# Patient Record
Sex: FEMALE
[De-identification: names, ages, dates, MRNs, and addresses within clinical notes are randomized per-mention and may not be internally consistent; named-entity substitution may affect disease eponyms.]

---

## 2020-08-17 ENCOUNTER — HOSPITAL ENCOUNTER (OUTPATIENT)
Dept: PHYSICAL THERAPY | Facility: CLINIC | Age: 25
Setting detail: THERAPIES SERIES
Discharge: HOME | End: 2020-08-17
Payer: COMMERCIAL

## 2020-08-17 PROCEDURE — 97110 THERAPEUTIC EXERCISES: CPT

## 2020-08-17 PROCEDURE — 97162 PT EVAL MOD COMPLEX 30 MIN: CPT

## 2020-08-17 NOTE — FLOWSHEET NOTE
[employer] [address] [place] [address]  [address], [facility] 36.  Phone: [phone]  Fax:     [phone]    Physical Therapy Evaluation    Date:  2020  Patient: Aleajndro Pires  : 1995  MRN: [id number]  Physician: Malaika Clinton     Insurance: BCBS             Eligibility Status:  Eligible     Secondary Insurance (if applicable) ALLIED BENEFIT SYSTEM             Eligibility Status: Eligible     DOS: 20  # of visits allowed/remaining: based on med necessity   Source: Phone  Spoke To:  Joan  Reference: [id number]  Auth: NPRe  Medical Diagnosis: B post tibialis tendonitis    Rehab Codes: M76.829  Onset Date:                                    Subjective:  Pt reports pain of R > L med forefoot, med arch region, episodes of lat ankle pain, difficulty w/ prolonged walking, not able to run w/o pain. . Pt w/ long hx of foot/arch pain, surgical removal of R accessory navicular in . Previous PT, Orthotics, however symptoms never fully resolved. Pt noted progressive worsening of pain w/ her attempts to perform fitness exercises thi past winter, but does not recall specific injury.      PMHx: [] Unremarkable [] Diabetes [] HTN  [] Pacemaker   [] MI/Heart Problems [] Cancer [] Arthritis [] Asthma                         [x] refer to full medical chart  In [facility]  [] Other:        Tests: [x] X-Ray: [] MRI:  [] Other:    Medications: [x] Refer to full medical record [] None [] Other:  Allergies:      [x] Refer to full medical record [] None [] Other:    Function:  Hand Dominance  [x] Right  [] Left  Working:  [x] Normal Duty  [] Light Duty  [] Off D/T Condition  [] Retired     [] Not Employed  []  Disability  [] Other:            Return to work:   Job/ADL Description: Pt employed as  at [employer].    Pain:  [x] Yes  [] No Pain Rating: (0-10 scale) 8/10  Pain altered Tx:  [] Yes  [x] No  Action:    Symptoms:  [] Improving [] Worsening [x] Same    Objective:     L/R ROM  ° A/P STRENGTH   Ankle DF WNL WNL 4+ 4+   PF WNL WNL 4+ 4+   IV WNL WNL 4 4   EV WNL WNL 4+ 4+     OBSERVATION No Deficit Deficit Not Tested Comments   Palpation [] [x]     Sensation [x] []     Edema [x] []       FUNCTION Normal Difficult Unable   Ambulation [] [x] []   Squatting [] [x] []   Calf raise [] [x] []     ASSESSMENT   Pt limited by B pes planus foot, weakness w/ loss of eccentric subtalar joint control in WB activties w/ resultant med foot/arch pain. Pt would benefit from updated Orthotics along w/ use of stability shoe for activity. Will initiate exercises to improve foot/ankle strength, stability. PROBLEMS  Ankle pain  Ankle weakness  Ankle functional deficits    SHORT TERM GOALS ( 8 visits)  Ankle pain = 0  Ankle strength = 4+/5  Ankle function: walk, squat, calf raise w/o pain    LONG TERM GOALS ( 12 visits)  Independent Home Exercise program  Return to normal activity    PATIENT GOAL  Run w/o pain    Rehab Potential:  [x] Good  [] Fair  [] Poor   Suggested Professional Referral:  [x] No  [] Yes:  Barriers to Goal Achievement[de-identified]  [x] No  [] Yes:  Domestic Concerns:  [x] No  [] Yes:    Pt. Education:  [x] Plans/Goals, Risks/Benefits discussed  [x] Home exercise program  Method of Education: [x] Verbal  [x] Demo  [x] Written  Comprehension of Education:  [x] Verbalizes understanding. [x] Demonstrates understanding. [] Needs Review. [] Demonstrates/verbalizes understanding of HEP/Ed previously given.     Treatment Plan:  [x] Therapeutic Exercise    [] Modalities:  [] Therapeutic Activity    [] Ultrasound  [] Electrical Stimulation  [] Gait Training     [] Massage       [] Lumbar/Cervical Traction  [] Neuromuscular Re-education [x] Cold/hotpack [] Instruction in HEP  [] Manual Therapy   [] Aquatic Therapy [] Other:     [] Iontophoresis: 4 mg/mL Dexamethasone Sodium Phosphate 40-80 mAmin  [] Drug allergies reviewed    _______Initials           _______Date       Frequency:  1-2 x/week for 12 visits    Todays Treatment:     8/17/2020 Visit #1    Exercise Reps/ Time Weight/ Level Comments   Bike 10 min     Lat lag swings w/ IR foot 3x10     45 deg med knee drives w/ IR foot  6O55      R stance w/ Leg lunges fwd, lat, ER 3x10               Specific Instructions for next treatment:    Treatment Charges: Mins Units   [x] Evaluation ----- 1   []  Modalities     [x]  Ther Exercise 20 1   []  Manual Therapy     []  Ther Activities     []  Aquatics     []  Other       Time in: 1500     Time out: 1600    Electronically signed by: Conchita Jarrett PT        Physician Signature:________________________________Date:__________________  By signing above, I have reviewed this plan of care and certify a need for medically   necessary rehabilitation services.      *PLEASE SIGN ABOVE AND FAX BACK ALL PAGES*

## 2020-08-19 ENCOUNTER — HOSPITAL ENCOUNTER (OUTPATIENT)
Dept: PHYSICAL THERAPY | Facility: CLINIC | Age: 25
Setting detail: THERAPIES SERIES
Discharge: HOME | End: 2020-08-19
Payer: COMMERCIAL

## 2020-08-19 PROCEDURE — 97110 THERAPEUTIC EXERCISES: CPT

## 2020-08-19 NOTE — FLOWSHEET NOTE
[] [facility]) - [facility] &  Therapy  [address]  [phone]  F: [phone] [] [address]  [facility]   [address]  P: [phone]  F: [phone] [x] [address] &  Therapy  [facility]  P: [phone]  F: [phone] [] [address]  [facility]   [address]   [place]: [phone]  F: [phone]      Physical Therapy Daily Treatment Note    Date:  2020  Patient Name:  Yasir Shaikh    :  1995  MRN: [id number]  Physician: [address] [place] [address] Service Road: [facility] Diagnosis: B post tibialis tendonitis                          Rehab Codes: M76.829  Onset Date:                 Visit# / total visits:      Cancels/No Shows: 0/0    Subjective:    Pain:  [] Yes  [x] No Location: BLE posterior tib  Pain Rating: (0-10 scale) 0/10  Pain altered Tx:  [x] No  [] Yes  Action:  Comments: Pt reports no pain since she hasn't worked out all week. Pt states when it does bother her, R is still greater than L.     Objective:  Modalities:   Precautions:  Exercises:  Exercise Reps/ Time Weight/ Level Comments    Bike 10'   x   Gastroc Stretch 3x30\"   x   Soleus stretch 3x30\"   x   Lat leg swing w/ foot IR 3x10  Against wall x   45 deg med knee drives w/ foot IR 2O89   x   Lunge matrix 5x ea  Bilat x   Eccentric calf raises 2x10  3\" eccentric, floor x   SL RDL with cones 2x ea 3 cones  x   T-band foot inversion 3x10 No band  x   T-band foot eversion 3x10 No band  x   Toe Yoga NEXT      Arch shortening NEXT      Other:    Specific Instructions for subsequent treatments:     Treatment Charges: Mins Units   []  Modalities     [x]  Ther Exercise 45 3   []  Manual Therapy     []  Ther Activities     []  Aquatics     []  Vasocompression     []  Other     Total

## 2020-08-25 ENCOUNTER — APPOINTMENT (OUTPATIENT)
Dept: PHYSICAL THERAPY | Facility: CLINIC | Age: 25
End: 2020-08-25
Payer: COMMERCIAL

## 2020-08-27 ENCOUNTER — HOSPITAL ENCOUNTER (OUTPATIENT)
Dept: PHYSICAL THERAPY | Facility: CLINIC | Age: 25
Setting detail: THERAPIES SERIES
Discharge: HOME | End: 2020-08-27
Payer: COMMERCIAL

## 2020-09-03 ENCOUNTER — HOSPITAL ENCOUNTER (OUTPATIENT)
Dept: PHYSICAL THERAPY | Facility: CLINIC | Age: 25
Setting detail: THERAPIES SERIES
Discharge: HOME | End: 2020-09-03
Payer: COMMERCIAL

## 2020-09-03 PROCEDURE — 97110 THERAPEUTIC EXERCISES: CPT

## 2020-09-03 NOTE — FLOWSHEET NOTE
[] [facility]) - [facility] &  Therapy  [address]  [phone]  F: [phone] [] [address]  [facility] 36   [address]  P: [phone]  F: [phone] [x] [facility] &  Therapy  [facility]  P: [phone]  F: [phone] [] [address]  [facility]   [address]   [place]: [phone]  F: [phone]      Physical Therapy Daily Treatment Note    Date:  9/3/2020  Patient Name:  Angela Magana    :  1995  MRN: [id number]  Physician: [address] [place] [address] Service [address]: [facility] Diagnosis: B post tibialis tendonitis                          Rehab Codes: M76.829  Onset Date:                 Visit# / total visits: 3/12     Cancels/No Shows: 0/0    Subjective: Pt reports feet continue to be sore at time, R>L. Pain level depends on activity. Was sore following her previous visit. Had to cancel her previous two visits d/t being out of town for work.    Pain:  [] Yes  [x] No Location: BLE posterior tib  Pain Rating: (0-10 scale) 0/10  Pain altered Tx:  [x] No  [] Yes  Action:  Comments:     Objective:  Modalities:   Precautions:  Exercises:  Exercise Reps/ Time Weight/ Level Comments    Bike 10'   x   Gastroc Stretch 3x30\"   x   Soleus stretch 3x30\"   x   Lat leg swing w/ foot IR 3x10  Against wall    45 deg med knee drives w/ foot IR 5D98      Lunge matrix 5x ea  Bilat    Eccentric calf raises 2x10  3\" eccentric, floor x   SL RDL with cones 1x ea 3 cones  x   T-band 4 way ankle 15x lime  x   3 way hip 10x ea blue R CKC x   Toe Yoga 20x   x   Arch shortening 20x   x   Other:    Specific Instructions for subsequent treatments:     Treatment Charges: Mins Units   []  Modalities     [x]  Ther Exercise 45 3   []  Manual Therapy     []  Ther Activities     []

## 2020-09-10 ENCOUNTER — HOSPITAL ENCOUNTER (OUTPATIENT)
Dept: PHYSICAL THERAPY | Facility: CLINIC | Age: 25
Setting detail: THERAPIES SERIES
Discharge: HOME | End: 2020-09-10
Payer: COMMERCIAL

## 2020-09-10 PROCEDURE — 97110 THERAPEUTIC EXERCISES: CPT

## 2020-09-10 PROCEDURE — 97140 MANUAL THERAPY 1/> REGIONS: CPT

## 2020-09-10 NOTE — FLOWSHEET NOTE
[] [facility]) - [facility] &  Therapy  [address]  [phone]  F: [phone] [] [address]  [facility] 36   [address]  P: [phone]  F: [phone] [x] [address] &  Therapy  1500 [facility]  P: [phone]  F: [phone] [] [address]  [facility]   [address]   [place]: [phone]  F: [phone]      Physical Therapy Daily Treatment Note    Date:  9/10/2020  Patient Name:  Brianne Mercado    :  1995  MRN: [id number]  Physician: [address] [place] [address] [address]: [facility]       Medical Diagnosis: B post tibialis tendonitis                          Rehab Codes: M76.829  Onset Date:                 Visit# / total visits: 3/12     Cancels/No Shows: 0/0    Subjective: Pt states she is in a wedding this week and has been trying to wear high heels to prepare, increased soreness because of this. Pain:  [] Yes  [x] No Location: BLE posterior tib  Pain Rating: (0-10 scale) 0/10  Pain altered Tx:  [x] No  [] Yes  Action:  Comments:     Objective:  Modalities: CP bilat ankle 10' post ex  Precautions:  Exercises:  Exercise Reps/ Time Weight/ Level Comments    Bike 10'   x   Gastroc Stretch 3x30\"   x   HS stool stretch 2x30\"   x   BAPS bilat 15x ea L2 Fwd/back.  CW/CCW x   Balance Board L2x2'   x   Lat leg swing w/ foot IR 3x10  Against wall    45 deg med knee drives w/ foot IR 3T24      Lunge matrix 5x ea  Bilat    Eccentric calf raises 2x10  3\" eccentric, floor    SL RDL with cones 1x ea 3 cones  x   T-band 4 way ankle 15x lime  x   3 way hip 10x ea blue R CKC    Toe Yoga 20x   x   Arch shortening 20x   x   Other:    MFR via hypervolt bilat calf - 8'    Specific Instructions for subsequent treatments:     Treatment Charges: Mins Units   [x]  Modalities: CP 10 nc   [x] Ther Exercise 30 2   [x]  Manual Therapy 8 1   []  Ther Activities     []  Aquatics     []  Vasocompression     []  Other     Total Treatment time 48 3       Assessment: [x] Progressing toward goals. Added BAPS and balance board this date to address ROM and proprioception, pt challenged by both. Held SLS ex this date to avoid increased ankle irritation. Initiated manual this date to address soft tissue soreness bilaterally at calves. Provided CP post ex for symptom relief. [] No change. [x] Other:        SHORT TERM GOALS ( 8 visits)  Ankle pain = 0  Ankle strength = 4+/5  Ankle function: walk, squat, calf raise w/o pain     LONG TERM GOALS ( 12 visits)  Independent Home Exercise program  Return to normal activity     PATIENT GOAL  Run w/o pain    Pt. Education:  [x] Yes  [] No  [] Reviewed Prior HEP/Ed  Method of Education: [x] Verbal  [x] Demo  [] Written  Comprehension of Education:  [x] Verbalizes understanding. [x] Demonstrates understanding. [] Needs review. [] Demonstrates/verbalizes HEP/Ed previously given. Plan: [x] Continue current frequency toward long and short term goals.           Time In: 4:05pm            Time Out: 5:00pm    Electronically signed by:  Nenita Carpio PTA

## 2020-09-14 ENCOUNTER — HOSPITAL ENCOUNTER (OUTPATIENT)
Dept: PHYSICAL THERAPY | Facility: CLINIC | Age: 25
Setting detail: THERAPIES SERIES
Discharge: HOME | End: 2020-09-14
Payer: COMMERCIAL

## 2020-09-14 PROCEDURE — 97110 THERAPEUTIC EXERCISES: CPT

## 2020-09-14 PROCEDURE — 97016 VASOPNEUMATIC DEVICE THERAPY: CPT

## 2020-09-14 NOTE — FLOWSHEET NOTE
[] [facility]) - [facility]  [facility]  [address]  [phone]  F: [phone] [] [address]  [place] 36   [address]  P: [phone]  F: [phone] [x] [address] &  Therapy  [facility]  P: [phone]  F: [phone] [] [address]  [facility]   [address]   [place]: [phone]  F: [phone]      Physical Therapy Daily Treatment Note    Date:  2020  Patient Name:  Tena Kerr    :  1995  MRN: [id number]  Physician: [address] [place] [address] Service [address]: [facility]       Medical Diagnosis: B post tibialis tendonitis                          Rehab Codes: M76.829  Onset Date:                 Visit# / total visits:      Cancels/No Shows: 0/0    Subjective: Pt mentions only mild soreness following her previous visit. Was in a wedding this past weekend which required her to wear high heels which increased pain bilaterally in her feet, soaked them in ice after which helped decrease pain. Pain:  [] Yes  [x] No Location: BLE posterior tib  Pain Rating: (0-10 scale) 0/10  Pain altered Tx:  [x] No  [] Yes  Action:  Comments:     Objective:  Modalities: Vasocompression bilat ankle - 15', medium pressure  Precautions:  Exercises:  Exercise Reps/ Time Weight/ Level Comments    Bike 10'   x   Gastroc Stretch 3x30\"   x   HS stool stretch 2x30\"   x   BAPS bilat 15x ea L2 Fwd/back.  CW/CCW x   Balance Board L2x2'   x   Lat leg swing w/ foot IR 3x10  Against wall    45 deg med knee drives w/ foot IR 6L43      Lunge matrix 5x ea  Bilat x   Eccentric calf raises 2x10  3\" eccentric, floor x   SL RDL with cones 1x ea 3 cones  x   T-band 4 way ankle 15x lime  x   3 way hip 10x ea blue R CKC    Toe Yoga 20x   x   Arch shortening 20x   x   Other:    MFR via hypervolt bilat

## 2020-09-16 ENCOUNTER — HOSPITAL ENCOUNTER (OUTPATIENT)
Dept: PHYSICAL THERAPY | Facility: CLINIC | Age: 25
Setting detail: THERAPIES SERIES
Discharge: HOME | End: 2020-09-16
Payer: COMMERCIAL

## 2020-09-16 PROCEDURE — 97110 THERAPEUTIC EXERCISES: CPT

## 2020-09-16 PROCEDURE — 97016 VASOPNEUMATIC DEVICE THERAPY: CPT

## 2020-09-16 NOTE — FLOWSHEET NOTE
8'    Specific Instructions for subsequent treatments:     Treatment Charges: Mins Units   []  Modalities: CP     [x]  Ther Exercise 45 3   [x]  Manual Therapy     []  Ther Activities     []  Aquatics     [x]  Vasocompression 10 1   []  Other     Total Treatment time 55 4       Assessment: [x] Progressing toward goals. Continued with SLS ex to progress SL stability and improved tolerance. Minor improvements noted with less UE support to complete balance board and 3 way hip. Lunges completed onto [employer] ball for additional challenge. TGYM squats added this date to introduce squatting movement, good tolerance. Soreness present post ex. [] No change. [x] Other:        SHORT TERM GOALS ( 8 visits)  Ankle pain = 0  Ankle strength = 4+/5  Ankle function: walk, squat, calf raise w/o pain     LONG TERM GOALS ( 12 visits)  Independent Home Exercise program  Return to normal activity     PATIENT GOAL  Run w/o pain    Pt. Education:  [x] Yes  [] No  [] Reviewed Prior HEP/Ed  Method of Education: [x] Verbal  [x] Demo  [] Written  Comprehension of Education:  [x] Verbalizes understanding. [x] Demonstrates understanding. [] Needs review. [] Demonstrates/verbalizes HEP/Ed previously given. Plan: [x] Continue current frequency toward long and short term goals.           Time In: 5:00pm            Time Out: 6:00pm    Electronically signed by:  Jose David Carroll PTA

## 2020-09-21 ENCOUNTER — HOSPITAL ENCOUNTER (OUTPATIENT)
Dept: PHYSICAL THERAPY | Facility: CLINIC | Age: 25
Setting detail: THERAPIES SERIES
Discharge: HOME | End: 2020-09-21
Payer: COMMERCIAL

## 2020-09-21 PROCEDURE — 97016 VASOPNEUMATIC DEVICE THERAPY: CPT

## 2020-09-21 PROCEDURE — 97110 THERAPEUTIC EXERCISES: CPT

## 2020-09-21 NOTE — FLOWSHEET NOTE
[] [facility]) - [facility] &  Therapy  [address]  [phone]  F: [phone] [] [address]  [facility] 36   [address]  P: [phone]  F: [phone] [x] [address] &  Therapy  [address] [facility]  P: [phone]  F: [phone] [] [address]  [facility]   [address]   [place]: [phone]  F: [phone]      Physical Therapy Daily Treatment Note    Date:  2020  Patient Name:  Alexandra Espinal    :  1995  MRN: [id number]  Physician: [address] [place] [address] [address]: [place] [address]       Medical Diagnosis: B post tibialis tendonitis                          Rehab Codes: M76.829  Onset Date:                 Visit# / total visits:      Cancels/No Shows: 0/0    Subjective: Pt feels she is improving some with PT, has not done much that would challenge her ankles at this point. Did get fitted for new orthodics. Pain:  [] Yes  [x] No Location: BLE posterior tib  Pain Rating: (0-10 scale) 0/10  Pain altered Tx:  [x] No  [] Yes  Action:  Comments:     Objective:  Modalities: Vasocompression bilat ankle - 15', medium pressure  Precautions:  Exercises:  Exercise Reps/ Time Weight/ Level Comments    Bike 10'   x   Gastroc Stretch 3x30\"   x   HS stool stretch 2x30\"   x   BAPS bilat 15x ea L2 Fwd/back.  CW/CCW x   Balance Board L2x2'   x   Lat leg swing w/ foot IR 3x10  Against wall    45 deg med knee drives w/ foot IR 0S03      BOSU Lunges 10x ea  Bilat x   BOSU step ups 10x ea   x   Eccentric calf raises 2x10  3\" eccentric, floor x   TGym squats/HR 30x ea L18     SL RDL with cones 1x ea 3 cones     T-band 4 way ankle 15x lime  x   3 way hip 10x ea blue R CKC x   Rebounder 20x ea 2KG     Toe Yoga 20x      Arch shortening 20x      Other:    MFR via hypervolt bilat calf -

## 2020-09-23 ENCOUNTER — HOSPITAL ENCOUNTER (OUTPATIENT)
Dept: PHYSICAL THERAPY | Facility: CLINIC | Age: 25
Setting detail: THERAPIES SERIES
Discharge: HOME | End: 2020-09-23
Payer: COMMERCIAL

## 2020-09-23 PROCEDURE — 97016 VASOPNEUMATIC DEVICE THERAPY: CPT

## 2020-09-23 PROCEDURE — 97110 THERAPEUTIC EXERCISES: CPT

## 2020-09-23 NOTE — FLOWSHEET NOTE
[] [facility]) - [facility]  [facility]  [address]  [phone]  F: [phone] [] [address]  [facility] 36   [address]  P: [phone]  F: [phone] [x] [address] &  Therapy  [address] [facility]  P: [phone]  F: [phone] [] [address]  [facility]   [address]   [place]: [phone]  F: [phone]      Physical Therapy Daily Treatment Note    Date:  2020  Patient Name:  Otoniel Jon    :  1995  MRN: [id number]  Physician: [address] [place] [address] Service [address]: [facility]       Medical Diagnosis: B post tibialis tendonitis                          Rehab Codes: M76.829  Onset Date:                 Visit# / total visits:      Cancels/No Shows: 0/0    Subjective: Pt with no new issues to report at this time. Pain:  [] Yes  [x] No Location: BLE posterior tib  Pain Rating: (0-10 scale) 0/10  Pain altered Tx:  [x] No  [] Yes  Action:  Comments:     Objective:  Modalities: Vasocompression bilat ankle - 15', medium pressure  Precautions:  Exercises:  Exercise Reps/ Time Weight/ Level Comments    Bike 10'   x   Gastroc Stretch 3x30\"   x   HS stool stretch 2x30\"   x   BAPS bilat 15x ea L3 Fwd/back.  CW/CCW x   Balance Board L2x2'      Lat leg swing w/ foot IR 3x10  Against wall    45 deg med knee drives w/ foot IR 9X24      BOSU Lunges 10x ea  Bilat x   BOSU step ups 10x ea   x   Eccentric calf raises 2x10  3\" eccentric, floor x   TGym squats/HR SL 30x ea L19  x   SL RDL with cones 2x ea 3 cones  x   T-band 4 way ankle 15x blue  x   3 way hip 10x ea blue R CKC x   Rebounder 20x ea 2KG     Toe Yoga 20x      Arch shortening 20x      Other:    MFR via hypervolt bilat calf - prn    Specific Instructions for subsequent treatments:     Treatment Charges: Mins Units   [] Modalities: CP     [x]  Ther Exercise 45 3   [x]  Manual Therapy     []  Ther Activities     []  Aquatics     [x]  Vasocompression 10 1   []  Other     Total Treatment time 55 4       Assessment: [x] Progressing toward goals. Pt demonstrating improved control with increased level on BAPS. Instructed in SL Tgym squats to improve LE strength and stability. Able to increase reps of SL DL cones this date d/t improved stability and endurance, reporting soreness after but less intensity of soreness. Continued with vaso post ex for pain control. [] No change. [x] Other:        SHORT TERM GOALS ( 8 visits)  Ankle pain = 0  Ankle strength = 4+/5  Ankle function: walk, squat, calf raise w/o pain     LONG TERM GOALS ( 12 visits)  Independent Home Exercise program  Return to normal activity     PATIENT GOAL  Run w/o pain    Pt. Education:  [x] Yes  [] No  [] Reviewed Prior HEP/Ed  Method of Education: [x] Verbal  [x] Demo  [] Written  Comprehension of Education:  [x] Verbalizes understanding. [x] Demonstrates understanding. [] Needs review. [] Demonstrates/verbalizes HEP/Ed previously given. Plan: [x] Continue current frequency toward long and short term goals.           Time In: 5:00pm            Time Out: 6:00pm    Electronically signed by:  Deanna Son PTA

## 2020-09-28 ENCOUNTER — HOSPITAL ENCOUNTER (OUTPATIENT)
Dept: PHYSICAL THERAPY | Facility: CLINIC | Age: 25
Setting detail: THERAPIES SERIES
Discharge: HOME | End: 2020-09-28
Payer: COMMERCIAL

## 2020-09-28 PROCEDURE — 97110 THERAPEUTIC EXERCISES: CPT

## 2020-09-28 NOTE — FLOWSHEET NOTE
[] [facility]) - [facility]  [facility]  [address]  [phone]  F: [phone] [] [address]  [facility] 36   [address]  P: [phone]  F: [phone] [x] [address] &  Therapy  [address] [facility] [address]  P: [phone]  F: [phone] [] [address]  [facility]   [address]   [place]: [phone]  F: [phone]      Physical Therapy Daily Treatment Note    Date:  2020  Patient Name:  Sotero Boo    :  1995  MRN: [id number]  Physician: [address] [place] [address] Service [address]: [facility]       Medical Diagnosis: B post tibialis tendonitis                          Rehab Codes: M76.829  Onset Date:                 Visit# / total visits:      Cancels/No Shows: 0/0    Subjective: Pt feels some slight improvement has been being made, less pain following a shift at work this past Saturday. Pain:  [] Yes  [x] No Location: BLE posterior tib  Pain Rating: (0-10 scale) 0/10  Pain altered Tx:  [x] No  [] Yes  Action:  Comments:     Objective:  Modalities: Vasocompression bilat ankle - 15', medium pressure  Precautions:  Exercises:  Exercise Reps/ Time Weight/ Level Comments    Bike 10'   x   Gastroc Stretch 3x30\"   x   HS stool stretch 2x30\"   x   BAPS bilat 15x ea L3 Fwd/back.  CW/CCW x   Balance Board L2x2'      Lat leg swing w/ foot IR 3x10  Against wall    45 deg med knee drives w/ foot IR 3Q05      BOSU Lunges 10x ea  Bilat x   BOSU step ups 10x ea   x   Eccentric calf raises 2x10  3\" eccentric, floor x   TGym squats/HR SL 30x ea L19  x   SL RDL with cones 2x ea 3 cones  x   T-band 4 way ankle 15x blue     3 way hip 10x ea blue R CKC    Rebounder 20x ea 2KG     Toe Yoga 20x      Arch shortening 20x      Other:    MFR via hypervolt bilat calf - prn    Specific Instructions for

## 2020-09-29 NOTE — FLOWSHEET NOTE
[employer]. [address] [place] [address]  [address], [facility] 36.  Phone: [phone]  Fax:     [phone]    Physical Therapy Progress Report    Date:  2020  Patient: Yasmeen Palacio  : 1995  MRN: [id number]  Physician: [id number]: [employer]             Eligibility Status:  Eligible     Secondary Insurance (if applicable) ALLIED BENEFIT SYSTEM             Eligibility Status: Eligible     DOS: 20  # of visits allowed/remaining: based on med necessity   Source: Phone  Spoke To:  Joan  Reference: [id number]  Auth: Nikhil  Medical Diagnosis: B post tibialis tendonitis    Rehab Codes: M76.829  Onset Date:                                    Subjective:  Pt cont episodes of R med arch arch pain, especially when on her feet for long periods of time at work. Pt feels her exercises have been helping, but her symptoms have not fully resolved. Pt states she has new orthotics ordered and should be receiving them within 2 wks. Pain:  [x] Yes  [] No Pain Rating: (0-10 scale) 2/10  Pain altered Tx:  [] Yes  [x] No  Action:    Symptoms:  [] Improving [] Worsening [x] Same    Objective:     L/R ROM  ° A/P STRENGTH   Ankle DF WNL WNL 4+ 4+   PF WNL WNL 4+ 4+   IV WNL WNL 4 4   EV WNL WNL 4+ 4+     OBSERVATION No Deficit Deficit Not Tested Comments   R calf raise [] [x]  +pain   R squat [] [x]  Increased hip add, IR w/ LOB       ASSESSMENT   Pt cont limited by R med arch pain, pes planus w/ weakness in eccentric subtalar control in weight bearing activities. Pt will benefit from orthotics along w/ cont PT for further single leg weight bearing strengthening, stabilization exercises.     PROBLEMS  Arch pain  Ankle weakness  Ankle functional deficits    SHORT TERM GOALS ( 8 visits)  Arch pain = 0  Ankle strength = 4+/5  Ankle function: walk, squat, calf raise w/o pain    LONG TERM GOALS ( 12 visits)  Independent Home Exercise program  Return to normal activity    Treatment Plan:  [x] Therapeutic Exercise    [] Modalities:  [] Therapeutic Activity    [] Ultrasound  [] Electrical Stimulation  [] Gait Training     [] Massage       [] Lumbar/Cervical Traction  [] Neuromuscular Re-education [x] Cold/hotpack [] Instruction in HEP  [] Manual Therapy   [] Aquatic Therapy [] Other:     [] Iontophoresis: 4 mg/mL Dexamethasone Sodium Phosphate 40-80 mAmin  [] Drug allergies reviewed    _______Initials           _______Date       Frequency:  1-2 x/week for 12 visits      Treatment Charges: Mins Units   []  Modalities     [x]  Ther Exercise 20 1   []  Manual Therapy     []  Ther Activities     []  Aquatics     []  Other       Time in: 1500     Time out: 1600    Electronically signed by: Kesha Joyner PT        Physician Signature:________________________________Date:__________________  By signing above, I have reviewed this plan of care and certify a need for medically   necessary rehabilitation services.      *PLEASE SIGN ABOVE AND FAX BACK ALL PAGES*

## 2020-09-30 ENCOUNTER — HOSPITAL ENCOUNTER (OUTPATIENT)
Dept: PHYSICAL THERAPY | Facility: CLINIC | Age: 25
Setting detail: THERAPIES SERIES
Discharge: HOME | End: 2020-09-30
Payer: COMMERCIAL

## 2020-09-30 PROCEDURE — 97110 THERAPEUTIC EXERCISES: CPT

## 2020-09-30 NOTE — FLOWSHEET NOTE
[] [facility]) - [facility]  [address]  [phone]  F: [phone] [] [address]  [facility]   [address]  P: [phone]  F: [phone] [x] [address] &  Therapy  1500 [facility]  P: [phone]  F: [phone] [] [address]  [facility]   [address]   [place]: [phone]  F: [phone]      Physical Therapy Daily Treatment Note    Date:  2020  Patient Name:  Azalea Mathews    :  1995  MRN: [id number]  Physician: [address] [place] [address] [address]: [facility]       Medical Diagnosis: B post tibialis tendonitis                          Rehab Codes: M76.829  Onset Date:                 Visit# / total visits: 10/24     Cancels/No Shows: 0/0    Subjective: Continued soreness bilat ankles, feet. More sore in L >R this date. Pain:  [] Yes  [x] No Location: BLE posterior tib  Pain Rating: (0-10 scale) 0/10  Pain altered Tx:  [x] No  [] Yes  Action:  Comments:     Objective:  Modalities: Vasocompression bilat ankle - 15', medium pressure  Precautions:  Exercises:  Exercise Reps/ Time Weight/ Level Comments    Bike 10'   x   Gastroc Stretch 3x30\"   x   HS stool stretch 2x30\"   x   BAPS bilat 15x ea L3 Fwd/back.  CW/CCW x   Balance Board L2x2'      Lat leg swing w/ foot IR 3x10  Against wall    45 deg med knee drives w/ foot IR 6W75      BOSU Lunges 10x ea  Bilat    BOSU step ups 15x ea   x   Eccentric calf raises 2x10  3\" eccentric, floor x   TGym squats/HR SL 30x ea L19  x   SL RDL with cones 2x ea 3 cones  x   T-band 4 way ankle 15x blue     3 way hip 10x ea blue R CKC x   Rebounder 20x ea 2KG     Toe Yoga 20x      Arch shortening 20x      Other:    MFR via hypervolt bilat calf - prn    Specific Instructions for subsequent treatments:     Treatment Charges: Mins Units   []  Modalities: CP     [x]  Ther Exercise 45 3   []  Manual Therapy     []  Ther Activities     []  Aquatics     []  Vasocompression     []  Other     Total Treatment time 45 3       Assessment: [x] Progressing toward goals. Continued focus on SLS stability, good tolerance. Increased soreness as compared to previous visits this date. Tenderness of PF bilaterally. Good eccentric control noted with 4 way ankle. Will continue to progress. [] No change. [x] Other:        SHORT TERM GOALS ( 8 visits)  Ankle pain = 0  Ankle strength = 4+/5  Ankle function: walk, squat, calf raise w/o pain     LONG TERM GOALS ( 12 visits)  Independent Home Exercise program  Return to normal activity     PATIENT GOAL  Run w/o pain    Pt. Education:  [x] Yes  [] No  [] Reviewed Prior HEP/Ed  Method of Education: [x] Verbal  [x] Demo  [] Written  Comprehension of Education:  [x] Verbalizes understanding. [x] Demonstrates understanding. [] Needs review. [] Demonstrates/verbalizes HEP/Ed previously given. Plan: [x] Continue current frequency toward long and short term goals.           Time In: 5:00pm            Time Out: 5:56pm    Electronically signed by:  Anusha Camara PTA

## 2020-10-05 ENCOUNTER — HOSPITAL ENCOUNTER (OUTPATIENT)
Dept: PHYSICAL THERAPY | Facility: CLINIC | Age: 25
Setting detail: THERAPIES SERIES
Discharge: HOME | End: 2020-10-05
Payer: COMMERCIAL

## 2020-10-05 PROCEDURE — 97110 THERAPEUTIC EXERCISES: CPT

## 2020-10-05 NOTE — FLOWSHEET NOTE
[] [facility]  Outpatient Rehabilitation &  Therapy  [address]  [phone]  F: [phone] [] [address]  [facility] 36   [address]  P: [phone]  F: [phone] [x] [address] &  Therapy  1500 [facility]  P: [phone]  F: [phone] [] [address]  [facility]   [address]   [place]: [phone]  F: [phone]      Physical Therapy Daily Treatment Note    Date:  10/5/2020  Patient Name:  Miriam Albert    :  1995  MRN: [id number]  Physician: [address] [place] [address] [address]: Anish Calderón       Medical Diagnosis: B post tibialis tendonitis                          Rehab Codes: M76.829  Onset Date:                 Visit# / total visits:      Cancels/No Shows: 0/0    Subjective: Pt cont c/o med forefoot pain, received new orthotics last Fri, states she is still getting used to them  Pain:  [] Yes  [x] No Location: BLE posterior tib  Pain Rating: (0-10 scale) 2/10  Pain altered Tx:  [x] No  [] Yes  Action:  Comments:     Objective:  Modalities: Vasocompression bilat ankle - 15', medium pressure  Precautions:  Exercises:  Exercise Reps/ Time Weight/ Level Comments    Bike 10'   x   Gastroc Stretch 3x30\"   x   HS stool stretch 2x30\"   x   BAPS bilat 15x ea L3 Fwd/back.  CW/CCW x   Balance Board L2x2'      Lat leg swing w/ foot IR 3x10  Against wall    45 deg med knee drives w/ foot IR [id number]      BOSU Lunges 10x ea  Bilat    BOSU high step ups w/ MB reach 3x10 6#  x   Eccentric calf raises 3x10  3\" eccentric, floor x   TGym squats/HR SL 30x ea L20  x   SL RDL with cones 2x ea 3 cones  x   T-band 4 way ankle 15x blue     3 way hip 10x ea blue R CKC w/ grn pad x   Rebounder 20x ea 2KG grn pad    Toe Yoga 20x      Arch shortening 20x      Other:    MFR via hypervolt bilat calf -

## 2020-10-08 ENCOUNTER — HOSPITAL ENCOUNTER (OUTPATIENT)
Dept: PHYSICAL THERAPY | Facility: CLINIC | Age: 25
Setting detail: THERAPIES SERIES
Discharge: HOME | End: 2020-10-08
Payer: COMMERCIAL

## 2020-10-12 ENCOUNTER — APPOINTMENT (OUTPATIENT)
Dept: PHYSICAL THERAPY | Facility: CLINIC | Age: 25
End: 2020-10-12
Payer: COMMERCIAL

## 2020-10-14 ENCOUNTER — HOSPITAL ENCOUNTER (OUTPATIENT)
Dept: PHYSICAL THERAPY | Facility: CLINIC | Age: 25
Setting detail: THERAPIES SERIES
Discharge: HOME | End: 2020-10-14
Payer: COMMERCIAL

## 2020-10-14 PROCEDURE — 97110 THERAPEUTIC EXERCISES: CPT

## 2020-10-14 PROCEDURE — 97016 VASOPNEUMATIC DEVICE THERAPY: CPT

## 2020-10-14 NOTE — FLOWSHEET NOTE
[] [facility]) - [facility]  [facility]  [address]  [phone]  F: [phone] [] [address]  [facility]   [address]  P: [phone]  F: [phone] [x] [address] &  Therapy  [address] [facility] [address]  P: [phone]  F: [phone] [] [address]  [facility]   [address]   [place]: [phone]  F: [phone]      Physical Therapy Daily Treatment Note    Date:  10/14/2020  Patient Name:  Bart López    :  1995  MRN: [id number]  Physician: [address] [place] [address] [address]: [facility]       Medical Diagnosis: B post tibialis tendonitis                          Rehab Codes: M76.829  Onset Date:                 Visit# / total visits:      Cancels/No Shows: 0/0    Subjective: Increased soreness following her previous visit. Doing well at this time, no increased pain. Pain:  [] Yes  [x] No Location: BLE posterior tib  Pain Rating: (0-10 scale) 2/10  Pain altered Tx:  [x] No  [] Yes  Action:  Comments:     Objective:  Modalities: Vasocompression bilat ankle - 15', medium pressure  Precautions:  Exercises:  Exercise Reps/ Time Weight/ Level Comments    Bike 10'   x   Gastroc Stretch 3x30\"   x   HS stool stretch 2x30\"   x   BAPS bilat 15x ea L3 Fwd/back.  CW/CCW x   Balance Board L2x2'      Lat leg swing w/ foot IR 3x10  Against wall    BOSU Lunges 10x ea  Bilat    BOSU high step ups w/ MB reach 3x10 6#  x   Eccentric calf raises 3x10  3\" eccentric, floor x   TGym squats/HR SL 30x ea L20  x   SL RDL with cones 2x ea 3 cones  x   T-band 4 way ankle 15x plum     3 way hip 10x ea blue R CKC w/ grn pad x   Rebounder 20x ea 2KG grn pad    Toe Yoga HEP      Arch shortening HEP      Other:    MFR via hypervolt bilat calf - prn    Specific Instructions for subsequent treatments: Treatment Charges: Mins Units   []  Modalities: CP     [x]  Ther Exercise 45 3   []  Manual Therapy     []  Ther Activities     []  Aquatics     [x]  Vasocompression 10 1   []  Other     Total Treatment time 55 4       Assessment: [x] Progressing toward goals. [] No change. [x] Other: Continued with SLS ex to challenge stability and improve LE strength. Noting increasded soreness of plantar aspect of bilateral feet but symptoms are consistent with previous visit. Eccentric control improving with resisted 4 wy ankle, increased resistance. SHORT TERM GOALS ( 8 visits)  Ankle pain = 0  Ankle strength = 4+/5  Ankle function: walk, squat, calf raise w/o pain     LONG TERM GOALS ( 12 visits)  Independent Home Exercise program  Return to normal activity     PATIENT GOAL  Run w/o pain    Pt. Education:  [x] Yes  [] No  [] Reviewed Prior HEP/Ed  Method of Education: [x] Verbal  [x] Demo  [] Written  Comprehension of Education:  [x] Verbalizes understanding. [x] Demonstrates understanding. [] Needs review. [] Demonstrates/verbalizes HEP/Ed previously given. Plan: [x] Continue current frequency toward long and short term goals.           Time In: 5:00pm            Time Out: 6:08pm    Electronically signed by:  Nikunj Rivera PTA

## 2020-10-19 ENCOUNTER — HOSPITAL ENCOUNTER (OUTPATIENT)
Dept: PHYSICAL THERAPY | Facility: CLINIC | Age: 25
Setting detail: THERAPIES SERIES
Discharge: HOME | End: 2020-10-19
Payer: COMMERCIAL

## 2020-10-19 PROCEDURE — 97110 THERAPEUTIC EXERCISES: CPT

## 2020-10-19 NOTE — FLOWSHEET NOTE
[] [facility]) - [facility]  [facility]  [address]  [phone]  F: [phone] [] [address]  [facility]   [address]  P: [phone]  F: [phone] [x] [address] &  Therapy  1500 [facility]  P: [phone]  F: [phone] [] [address]  [facility]   [address]   [place]: [phone]  F: [phone]      Physical Therapy Daily Treatment Note    Date:  10/19/2020  Patient Name:  Cheri Talley    :  1995  MRN: [id number]  Physician: [address] [place] [address] [address]: [facility]       Medical Diagnosis: B post tibialis tendonitis                          Rehab Codes: M76.829  Onset Date:                 Visit# / total visits:      Cancels/No Shows: 0/0    Subjective: Pt worked this past Saturday, still sore after but not to the extent as before. Wore her new insoles when she worked. Also bought new shoes with increased stability. Pain:  [] Yes  [x] No Location: BLE posterior tib  Pain Rating: (0-10 scale) 2/10  Pain altered Tx:  [x] No  [] Yes  Action:  Comments:     Objective:  Modalities: Vasocompression bilat ankle - 15', medium pressure  Precautions:  Exercises:  Exercise Reps/ Time Weight/ Level Comments    Bike 10'   x   Gastroc Stretch 3x30\"   x   HS stool stretch 2x30\"   x   BAPS bilat 15x ea L3 Fwd/back.  CW/CCW x   Balance Board L2x2'      BOSU Lunges 10x ea  Bilat    BOSU high step ups w/ MB reach 3x10 6#  x   Eccentric calf raises 3x10  3\" eccentric, floor x   TGym squats/HR SL 30x ea L20  x   SL RDL with cones 3x ea 3 cones  x   T-band 4 way ankle 15x plum     3 way hip 10x ea blue R CKC w/ grn pad x   Rebounder 20x ea 2KG grn pad x   Toe Yoga HEP      Arch shortening HEP      Other:    MFR via hypervolt bilat calf - prn    Specific Instructions

## 2020-10-23 ENCOUNTER — HOSPITAL ENCOUNTER (OUTPATIENT)
Dept: PHYSICAL THERAPY | Facility: CLINIC | Age: 25
Setting detail: THERAPIES SERIES
Discharge: HOME | End: 2020-10-23
Payer: COMMERCIAL

## 2020-10-23 PROCEDURE — 97016 VASOPNEUMATIC DEVICE THERAPY: CPT

## 2020-10-23 PROCEDURE — 97110 THERAPEUTIC EXERCISES: CPT

## 2020-10-23 PROCEDURE — 97140 MANUAL THERAPY 1/> REGIONS: CPT

## 2020-10-23 NOTE — FLOWSHEET NOTE
[] [facility]) - [facility]  [facility]  [address]  [phone]  F: [phone] [] [address]  [facility]   [address]  P: [phone]  F: [phone] [x] [address] &  Therapy  1500 [facility]  P: [phone]  F: [phone] [] [address]  [facility]   [address]   [place]: [phone]  F: [phone]      Physical Therapy Daily Treatment Note    Date:  10/23/2020  Patient Name:  Berndaette Franco    :  1995  MRN: [id number]  Physician: [address] [place] [address] [address]: [facility]       Medical Diagnosis: B post tibialis tendonitis                          Rehab Codes: M76.829  Onset Date:                 Visit# / total visits:      Cancels/No Shows: 0/0    Subjective: Pt feels slightly less bilat feet soreness today. Pain:  [] Yes  [x] No Location: BLE posterior tib  Pain Rating: (0-10 scale) 2/10  Pain altered Tx:  [x] No  [] Yes  Action:  Comments:     Objective:  Modalities: Vasocompression bilat ankle - 15', medium pressure  Precautions:  Exercises:  Exercise Reps/ Time Weight/ Level Comments    Bike 10'   x   Gastroc Stretch 3x30\"   x   HS stool stretch 2x30\"      BAPS bilat 15x ea L3 Fwd/back.  CW/CCW x   Balance Board L2x2'      BOSU Lunges 10x ea  Bilat    BOSU high step ups w/ MB reach 3x10 6#  x   Eccentric calf raises 3x10  3\" eccentric, floor x   TGym squats/HR SL 30x ea L20  x   SL RDL with cones 3x ea 3 cones  x   T-band 4 way ankle 15x plum     3 way hip 10x ea blue R CKC w/ grn pad x   Rebounder 20x ea 2KG grn pad x   Toe Yoga HEP      Arch shortening HEP      Other:    MFR bilat calf - 10'    Specific Instructions for subsequent treatments:     Treatment Charges: Mins Units   []  Modalities: CP     [x]  Ther Exercise 40 2   [x]  Manual Therapy 10 1   []  Ther Activities     []  Aquatics     [x]  Vasocompression 10 1   []  Other     Total Treatment time 60 4       Assessment: [x] Progressing toward goals. [] No change. [x] Other: Increased R plantar foot soreness with progression through treatment. SLS ex continue to be challenging. Trp noted bilaterally at medial and lateral gastroc head, R>L. Vaso applied post ex to decrease soreness. SHORT TERM GOALS ( 8 visits)  Ankle pain = 0  Ankle strength = 4+/5  Ankle function: walk, squat, calf raise w/o pain     LONG TERM GOALS ( 12 visits)  Independent Home Exercise program  Return to normal activity     PATIENT GOAL  Run w/o pain    Pt. Education:  [x] Yes  [] No  [] Reviewed Prior HEP/Ed  Method of Education: [x] Verbal  [x] Demo  [] Written  Comprehension of Education:  [x] Verbalizes understanding. [x] Demonstrates understanding. [] Needs review. [] Demonstrates/verbalizes HEP/Ed previously given. Plan: [x] Continue current frequency toward long and short term goals.           Time In: 3:00pm            Time Out: 4:00pm    Electronically signed by:  Jordyn Tsang PTA

## 2020-10-26 ENCOUNTER — HOSPITAL ENCOUNTER (OUTPATIENT)
Dept: PHYSICAL THERAPY | Facility: CLINIC | Age: 25
Setting detail: THERAPIES SERIES
Discharge: HOME | End: 2020-10-26
Payer: COMMERCIAL

## 2020-10-26 PROCEDURE — 97016 VASOPNEUMATIC DEVICE THERAPY: CPT

## 2020-10-26 PROCEDURE — 97110 THERAPEUTIC EXERCISES: CPT

## 2020-10-26 NOTE — FLOWSHEET NOTE
subsequent treatments:     Treatment Charges: Mins Units   []  Modalities: CP     [x]  Ther Exercise 45 2   []  Manual Therapy     []  Ther Activities     []  Aquatics     [x]  Vasocompression 10 1   []  Other     Total Treatment time 55 4       Assessment: [x] Progressing toward goals. [] No change. [x] Other: Continued with SLS specific therex to address stability. No significant increase in soreness with progression through ex. Added MOBO SLS ex to further address intrinsic foot and lower leg strengthening to improve stability. Pt mentioning lateral ankle soreness bilaterally following MOBO. Vaso post ex for symptom control. SHORT TERM GOALS ( 8 visits)  Ankle pain = 0  Ankle strength = 4+/5  Ankle function: walk, squat, calf raise w/o pain     LONG TERM GOALS ( 12 visits)  Independent Home Exercise program  Return to normal activity     PATIENT GOAL  Run w/o pain    Pt. Education:  [x] Yes  [] No  [] Reviewed Prior HEP/Ed  Method of Education: [x] Verbal  [x] Demo  [] Written  Comprehension of Education:  [x] Verbalizes understanding. [x] Demonstrates understanding. [] Needs review. [] Demonstrates/verbalizes HEP/Ed previously given. Plan: [x] Continue current frequency toward long and short term goals.           Time In: 4:00pm            Time Out: 5:00pm    Electronically signed by:  Maria Dolores Florence PTA

## 2020-10-28 ENCOUNTER — HOSPITAL ENCOUNTER (OUTPATIENT)
Dept: PHYSICAL THERAPY | Facility: CLINIC | Age: 25
Setting detail: THERAPIES SERIES
Discharge: HOME | End: 2020-10-28
Payer: COMMERCIAL

## 2020-10-28 PROCEDURE — 97016 VASOPNEUMATIC DEVICE THERAPY: CPT

## 2020-10-28 PROCEDURE — 97140 MANUAL THERAPY 1/> REGIONS: CPT

## 2020-10-28 PROCEDURE — 97110 THERAPEUTIC EXERCISES: CPT

## 2020-10-28 NOTE — FLOWSHEET NOTE
[] [facility]) - [facility] &  Therapy  [address]  [phone]  F: [phone] [] [address]  [facility] 36   [address]  P: [phone]  F: [phone] [x] [address] &  Therapy  1500 [facility]  P: [phone]  F: [phone] [] [address]  [facility]   [address]   [place]: [phone]  F: [phone]      Physical Therapy Daily Treatment Note    Date:  10/28/2020  Patient Name:  Uzma Hawthorne    :  1995  MRN: [id number]  Physician: [address] [place] [address] [address]: [facility]       Medical Diagnosis: B post tibialis tendonitis                          Rehab Codes: M76.829  Onset Date:                 Visit# / total visits:      Cancels/No Shows: 0/0    Subjective: Calf soreness as decreased, less pain on top of the R foot. No significant soreness present today. Pain:  [] Yes  [x] No Location: BLE posterior tib  Pain Rating: (0-10 scale) 2/10  Pain altered Tx:  [x] No  [] Yes  Action:  Comments:     Objective:  Modalities: Vasocompression bilat ankle - 15', medium pressure  Precautions:  Exercises:  Exercise Reps/ Time Weight/ Level Comments    Bike 10'   x   Gastroc Stretch 3x30\"   x   HS stool stretch 2x30\"      BAPS bilat 15x ea L3 Fwd/back.  CW/CCW x   Balance Board L2x2'      BOSU Lunges 10x ea  Bilat x   BOSU high step ups w/ MB reach 3x10 6#  x   Eccentric calf raises 3x10  3\" eccentric, floor    TGym squats/HR SL 30x ea L20  x   SL RDL 10x ea 9# KB  x   T-band 4 way ankle 15x plum     3 way hip 10x ea blue R CKC w/ grn pad    Rebounder 20x ea 2KG grn pad    MOBO rocks 30x ea bilat 2/4,1/3    MOBO SLS 2x15\"      Toe Yoga HEP      Arch shortening HEP      Other:    MFR bilat gastroc, peroneals -hypervolt same - 10'    Specific Instructions for subsequent treatments:     Treatment Charges: Mins Units   []  Modalities: CP     [x]  Ther Exercise 35 2   [x]  Manual Therapy 10 1   []  Ther Activities     []  Aquatics     [x]  Vasocompression 10 1   []  Other     Total Treatment time 55 4       Assessment: [x] Progressing toward goals. [] No change. [x] Other: progressed SL RDL cups to SL with KB to address LE strengthening as well, increased arch soreness of R foot present. Increased challenge with BOSU step ups this date. Resumed manual this date to address bilat lower leg tension. Vaso to end for symptom relief. SHORT TERM GOALS ( 8 visits)  Ankle pain = 0  Ankle strength = 4+/5  Ankle function: walk, squat, calf raise w/o pain     LONG TERM GOALS ( 12 visits)  Independent Home Exercise program  Return to normal activity     PATIENT GOAL  Run w/o pain    Pt. Education:  [x] Yes  [] No  [] Reviewed Prior HEP/Ed  Method of Education: [x] Verbal  [x] Demo  [] Written  Comprehension of Education:  [x] Verbalizes understanding. [x] Demonstrates understanding. [] Needs review. [] Demonstrates/verbalizes HEP/Ed previously given. Plan: [x] Continue current frequency toward long and short term goals.           Time In: 4:00pm            Time Out: 5:00pm    Electronically signed by:  Nikunj Rivera PTA

## 2020-11-02 ENCOUNTER — HOSPITAL ENCOUNTER (OUTPATIENT)
Dept: PHYSICAL THERAPY | Facility: CLINIC | Age: 25
Setting detail: THERAPIES SERIES
Discharge: HOME | End: 2020-11-02
Payer: COMMERCIAL

## 2020-11-02 PROCEDURE — 97140 MANUAL THERAPY 1/> REGIONS: CPT

## 2020-11-02 PROCEDURE — 97016 VASOPNEUMATIC DEVICE THERAPY: CPT

## 2020-11-02 PROCEDURE — 97110 THERAPEUTIC EXERCISES: CPT

## 2020-11-02 NOTE — FLOWSHEET NOTE
[] [facility]) - [facility] &  Therapy  [address]  [phone]  F: [phone] [] [address]  [facility] 36   [address]  P: [phone]  F: [phone] [x] [address] &  Therapy  1500 [facility]  P: [phone]  F: [phone] [] [address]  [facility]   [address]   [place]: [phone]  F: [phone]      Physical Therapy Daily Treatment Note    Date:  2020  Patient Name:  Jamie Chandra    :  1995  MRN: [id number]  Physician: [address] [place] [address] [address]: [place] [address]       Medical Diagnosis: B post tibialis tendonitis                          Rehab Codes: M76.829  Onset Date:                 Visit# / total visits:      Cancels/No Shows: 0/0    Subjective: Pt states that \"bumb on the top of her left foot is a cyst. Seen the podiatrist Friday and prescribed a compression sock she wanted her to wear. States Left lower leg has been more sore since, increased swelling as well. Pain:  [] Yes  [x] No Location: BLE posterior tib  Pain Rating: (0-10 scale) 2/10  Pain altered Tx:  [x] No  [] Yes  Action:  Comments:     Objective:  Modalities: Vasocompression bilat ankle - 15', medium pressure  Precautions:  Exercises:  Exercise Reps/ Time Weight/ Level Comments    Bike 10'   x   Gastroc Stretch 3x30\"   x   HS stool stretch 2x30\"      BAPS bilat 15x ea L3 Fwd/back.  CW/CCW x   Balance Board L2x2'      BOSU Lunges 10x ea  Bilat x   BOSU high step ups w/ MB reach 3x10 6#     Eccentric calf raises 3x10  3\" eccentric, floor    TGym squats/HR SL 30x ea L20  x   SL RDL 10x ea 9# KB  x   T-band 4 way ankle 15x plum  x   3 way hip 10x ea plum R CKC w/ blue pad x   Rebounder 20x ea 2KG grn pad    MOBO rocks 30x ea bilat 2/4,1/3    MOBO SLS 2x15\"      Toe Yoga HEP Arch shortening HEP      Other:    MFR bilat gastroc, peroneals  - 10'    Specific Instructions for subsequent treatments:     Treatment Charges: Mins Units   []  Modalities: CP     [x]  Ther Exercise 35 2   [x]  Manual Therapy 10 1   []  Ther Activities     []  Aquatics     [x]  Vasocompression 10 1   []  Other     Total Treatment time 55 4       Assessment: [x] Progressing toward goals. [] No change. [x] Other: Pt able to complete charted ex without minor discomfort of cyst on top of L foot. Instability continues to be an issue with SLS ex. Improved eccentric strength noted with4 way ankle. Bilateral gastroc/soleus tension noted. SHORT TERM GOALS ( 8 visits)  Ankle pain = 0  Ankle strength = 4+/5  Ankle function: walk, squat, calf raise w/o pain     LONG TERM GOALS ( 12 visits)  Independent Home Exercise program  Return to normal activity     PATIENT GOAL  Run w/o pain    Pt. Education:  [x] Yes  [] No  [] Reviewed Prior HEP/Ed  Method of Education: [x] Verbal  [x] Demo  [] Written  Comprehension of Education:  [x] Verbalizes understanding. [x] Demonstrates understanding. [] Needs review. [] Demonstrates/verbalizes HEP/Ed previously given. Plan: [x] Continue current frequency toward long and short term goals.           Time In: 5:00pm            Time Out: 6:00pm    Electronically signed by:  Jordyn Tsang PTA

## 2020-11-04 ENCOUNTER — HOSPITAL ENCOUNTER (OUTPATIENT)
Dept: PHYSICAL THERAPY | Facility: CLINIC | Age: 25
Setting detail: THERAPIES SERIES
Discharge: HOME | End: 2020-11-04
Payer: COMMERCIAL

## 2020-11-04 PROCEDURE — 97110 THERAPEUTIC EXERCISES: CPT

## 2020-11-04 PROCEDURE — 97140 MANUAL THERAPY 1/> REGIONS: CPT

## 2020-11-04 NOTE — FLOWSHEET NOTE
[] [facility]) [facility] &  Therapy  [address]  [phone]  F: [phone] [] [address]  [facility]   [address]  P: [phone]  F: [phone] [x] [address] &  Therapy  [address] [facility]  P: [phone]  F: [phone] [] [address]  [facility]   [address]   [place]: [phone]  F: [phone]      Physical Therapy Daily Treatment Note    Date:  2020  Patient Name:  Zohaib Hendrix    :  1995  MRN: [id number]  Physician: [address] [place] [address] Service [address]: [facility]       Medical Diagnosis: B post tibialis tendonitis                          Rehab Codes: M76.829  Onset Date:                 Visit# / total visits:      Cancels/No Shows: 0/0    Subjective:  Pt mentioning increased soreness lateral ankle, just below lateral malleolus. Pain:  [] Yes  [x] No Location: BLE posterior tib  Pain Rating: (0-10 scale) 2/10  Pain altered Tx:  [x] No  [] Yes  Action:  Comments:     Objective:  Modalities: Vasocompression bilat ankle - 15', medium pressure - held  Precautions:  Exercises:  Exercise Reps/ Time Weight/ Level Comments    Bike 10'   x   Gastroc Stretch 3x30\"   x   HS stool stretch 2x30\"      BAPS bilat 15x ea L3 Fwd/back.  CW/CCW x   Balance Board L2x2'      BOSU Lunges 10x ea  Bilat x   BOSU high step ups w/ MB reach 3x10 6#     Eccentric calf raises 3x10  3\" eccentric, floor    TGym squats/HR SL 30x ea L20     SL RDL 10x ea 9# KB  x   T-band 4 way ankle 15x plum     3 way hip 10x ea plum R CKC w/ blue pad x   Heel taps 2x10 4\"  x   Rebounder 20x ea 2KG grn pad    MOBO rocks 30x ea bilat 2/4,1/3    MOBO SLS 2x15\"      Toe Yoga HEP      Arch shortening HEP      Other:    MFR bilat gastroc, peroneals  - 10'    Specific Instructions for subsequent treatments:     Treatment Charges: Mins Units   []  Modalities: CP     [x]  Ther Exercise 35 2   [x]  Manual Therapy 10 1   []  Ther Activities     []  Aquatics     []  Vasocompression     []  Other     Total Treatment time 45 3       Assessment: [x] Progressing toward goals. [] No change. [x] Other: Continued with SLS ex with good tolerance. Improved stability noted with SLS cups this date. Added lateral heel taps to further address LE and ankle stability. Remains tender bilateral gastroc/soleus complex. Held vaso this date. SHORT TERM GOALS ( 8 visits)  Ankle pain = 0  Ankle strength = 4+/5  Ankle function: walk, squat, calf raise w/o pain     LONG TERM GOALS ( 12 visits)  Independent Home Exercise program  Return to normal activity     PATIENT GOAL  Run w/o pain    Pt. Education:  [x] Yes  [] No  [] Reviewed Prior HEP/Ed  Method of Education: [x] Verbal  [x] Demo  [] Written  Comprehension of Education:  [x] Verbalizes understanding. [x] Demonstrates understanding. [] Needs review. [] Demonstrates/verbalizes HEP/Ed previously given. Plan: [x] Continue current frequency toward long and short term goals.           Time In: 4:35pm            Time Out: 5:30pm    Electronically signed by:  Daniela Morris PTA

## 2020-11-12 ENCOUNTER — HOSPITAL ENCOUNTER (OUTPATIENT)
Dept: PHYSICAL THERAPY | Facility: CLINIC | Age: 25
Setting detail: THERAPIES SERIES
Discharge: HOME | End: 2020-11-12
Payer: COMMERCIAL

## 2020-11-12 PROCEDURE — 97110 THERAPEUTIC EXERCISES: CPT

## 2020-11-12 PROCEDURE — 97016 VASOPNEUMATIC DEVICE THERAPY: CPT

## 2020-11-12 PROCEDURE — 97140 MANUAL THERAPY 1/> REGIONS: CPT

## 2020-11-12 NOTE — FLOWSHEET NOTE
10'    Specific Instructions for subsequent treatments:     Treatment Charges: Mins Units   []  Modalities: CP     [x]  Ther Exercise 35 2   [x]  Manual Therapy 10 1   []  Ther Activities     []  Aquatics     [x]  Vasocompression 10 1   []  Other     Total Treatment time 55 3       Assessment: [x] Progressing toward goals. [] No change. [x] Other: Stability therex completed as noted per chart above. Continues to demonstrate weakness with ankle stability and maintain SLS balance. Less pain with progression through treatment. Remains extremely tender bilaterally into gatroc heads. Vaso to end treatment. SHORT TERM GOALS ( 8 visits)  Ankle pain = 0  Ankle strength = 4+/5  Ankle function: walk, squat, calf raise w/o pain     LONG TERM GOALS ( 12 visits)  Independent Home Exercise program  Return to normal activity     PATIENT GOAL  Run w/o pain    Pt. Education:  [x] Yes  [] No  [] Reviewed Prior HEP/Ed  Method of Education: [x] Verbal  [x] Demo  [] Written  Comprehension of Education:  [x] Verbalizes understanding. [x] Demonstrates understanding. [] Needs review. [] Demonstrates/verbalizes HEP/Ed previously given. Plan: [x] Continue current frequency toward long and short term goals.           Time In: 5:00pm            Time Out: 6:00pm    Electronically signed by:  Norm Dave PTA

## 2020-11-16 ENCOUNTER — HOSPITAL ENCOUNTER (OUTPATIENT)
Dept: PHYSICAL THERAPY | Facility: CLINIC | Age: 25
Setting detail: THERAPIES SERIES
Discharge: HOME | End: 2020-11-16
Payer: COMMERCIAL

## 2020-11-23 ENCOUNTER — HOSPITAL ENCOUNTER (OUTPATIENT)
Dept: PHYSICAL THERAPY | Facility: CLINIC | Age: 25
Setting detail: THERAPIES SERIES
Discharge: HOME | End: 2020-11-23
Payer: COMMERCIAL

## 2020-11-23 PROCEDURE — 97110 THERAPEUTIC EXERCISES: CPT

## 2020-11-23 PROCEDURE — 97016 VASOPNEUMATIC DEVICE THERAPY: CPT

## 2020-11-23 NOTE — FLOWSHEET NOTE
5'    Specific Instructions for subsequent treatments:     Treatment Charges: Mins Units   []  Modalities: CP     [x]  Ther Exercise 35 2   [x]  Manual Therapy 5 nc   []  Ther Activities     []  Aquatics     [x]  Vasocompression 10 1   []  Other     Total Treatment time 50 3       Assessment: [] Progressing toward goals. [] No change. [x] Other: Pt demonstrating increased challenge with stability ex this date. Increase bilateral ankle and plantar foot fatigue with progression through treatment. Good eccentric control with 4 way ankle. Increased tenderness into bilat anterior tib region. Vaso post ex to control post ex soreness. SHORT TERM GOALS ( 8 visits)  Ankle pain = 0  Ankle strength = 4+/5  Ankle function: walk, squat, calf raise w/o pain     LONG TERM GOALS ( 12 visits)  Independent Home Exercise program  Return to normal activity     PATIENT GOAL  Run w/o pain    Pt. Education:  [x] Yes  [] No  [] Reviewed Prior HEP/Ed  Method of Education: [x] Verbal  [x] Demo  [] Written  Comprehension of Education:  [x] Verbalizes understanding. [x] Demonstrates understanding. [] Needs review. [] Demonstrates/verbalizes HEP/Ed previously given. Plan: [x] Continue current frequency toward long and short term goals.           Time In: 6:00pm            Time Out: 6:55pm    Electronically signed by:  Maria Dolores Florence PTA

## 2020-12-03 ENCOUNTER — HOSPITAL ENCOUNTER (OUTPATIENT)
Dept: PHYSICAL THERAPY | Facility: CLINIC | Age: 25
Setting detail: THERAPIES SERIES
Discharge: HOME | End: 2020-12-03
Payer: COMMERCIAL

## 2020-12-03 PROCEDURE — 97140 MANUAL THERAPY 1/> REGIONS: CPT

## 2020-12-03 PROCEDURE — 97110 THERAPEUTIC EXERCISES: CPT

## 2020-12-03 NOTE — FLOWSHEET NOTE
[] [facility]) - [place] [facility]EL[facility] &  Therapy  [address]  [phone]  F: [phone] [] [address]  [facility]   [address]  P: [phone]  F: [phone] [x] [address] &  Therapy  [address] [facility]  P: [phone]  F: [phone] [] [address]  [facility]   [address]   [place]: [phone]  F: [phone]      Physical Therapy Daily Treatment Note    Date:  12/3/2020  Patient Name:  Bart López    :  1995  MRN: [id number]  Physician: [address] [place] [address] [address]: [facility] Diagnosis: B post tibialis tendonitis                          Rehab Codes: M76.829  Onset Date:                 Visit# / total visits:      Cancels/No Shows: 0/0    Subjective: Pt states she saw the podiatrist the other day and said feet were looking better but needs to work on generel LE and hip strengthening to improve stability. Arrives with new script for LE strengthening. Pain:  [] Yes  [x] No Location: BLE posterior tib  Pain Rating: (0-10 scale) 2/10  Pain altered Tx:  [x] No  [] Yes  Action:  Comments:     Objective:  Modalities: Vasocompression bilat ankle - 10', medium pressure   Precautions:  Exercises:  Exercise Reps/ Time Weight/ Level Comments    Bike 10'   x   Gastroc Stretch 3x30\"   x   HS stool stretch 2x30\"      BAPS bilat 15x ea L3 Fwd/back.  CW/CCW x          BOSU Lunges 10x ea  Bilat x   BOSU high step ups w/ MB reach 3x10 6#     TGym squats/HR SL 30x ea L20     SL RDL 10x ea 9# KB  x   T-band 4 way ankle 15x plum     3 way hip 10x ea plum R CKC w/ blue pad x   Rebounder 20x ea 2KG grn pad    Tband sidesteppping 3L lime  x          Tband Bridges 3x10 blue  x   SL clams 2x10 blue  x   SL hip abduction 2x10 blue  x   Prone hip ext 2x10  Knee bent/kne

## 2020-12-10 ENCOUNTER — HOSPITAL ENCOUNTER (OUTPATIENT)
Dept: PHYSICAL THERAPY | Facility: CLINIC | Age: 25
Setting detail: THERAPIES SERIES
Discharge: HOME | End: 2020-12-10
Payer: COMMERCIAL

## 2020-12-10 PROCEDURE — 97140 MANUAL THERAPY 1/> REGIONS: CPT

## 2020-12-10 PROCEDURE — 97110 THERAPEUTIC EXERCISES: CPT

## 2020-12-10 NOTE — FLOWSHEET NOTE
[] [facility]) - [facility]  [address]  [phone]  F: [phone] [] [address]  [facility] 36   [address]  P: [phone]  F: [phone] [x] [address] &  Therapy  [address] [facility]  P: [phone]  F: [phone] [] [address]  [facility]   [address]   [place]: [phone]  F: [phone]      Physical Therapy Daily Treatment Note    Date:  12/10/2020  Patient Name:  Bernadette Franco    :  1995  MRN: [id number]  Physician: [address] [place] [address] [address]: [facility]       Medical Diagnosis: B post tibialis tendonitis                          Rehab Codes: M76.829  Onset Date:                 Visit# / total visits:      Cancels/No Shows: 0/0    Subjective: No significant issues at this time. Increased bilateral ankle/foot soreness intermittently. Admits to not being complaint with HEP. Pain:  [] Yes  [x] No Location: BLE posterior tib  Pain Rating: (0-10 scale) 2/10  Pain altered Tx:  [x] No  [] Yes  Action:  Comments:     Objective:  Modalities: Vasocompression bilat ankle - 10', medium pressure   Precautions:  Exercises:  Exercise Reps/ Time Weight/ Level Comments    Bike 10'   x   Gastroc Stretch 3x30\"   x   HS stool stretch 2x30\"      BAPS bilat 15x ea L3 Fwd/back.  CW/CCW x          BOSU Lunges 10x ea  Bilat x   BOSU high step ups w/ MB reach 3x10 6#     TGym squats/HR  30x ea L15 sidelying    SL RDL 10x ea 9# KB  x   T-band 4 way ankle 15x plum     3 way hip 10x ea plum R CKC w/ blue pad x   Tband sidesteppping 3L blue  x          SB Bridges 3x10 blue  x   SL clams 2x10 blue  x   SL hip abduction 2x10 blue  x   Prone hip ext 2x10  Knee bent/kne straight x   Other:    MFR bilat gastroc, peroneals  - 10'    Specific Instructions for subsequent treatments:     Treatment Charges: Mins Units   []  Modalities: CP     [x]  Ther Exercise 40 2   [x]  Manual Therapy 10 1   []  Ther Activities     []  Aquatics     [x]  Vasocompression     []  Other     Total Treatment time 50 3       Assessment: [x] Progressing toward goals. [] No change. [x] Other: Continued with stability work along strengthening ex. Increased resistance with sidestepping ex and added sidelying TGym squats to address bilateral hip weakness. Pt mentions increased soreness post ex. Manual completed as noted above to continue to improve gastroc/soleus extensibility. SHORT TERM GOALS ( 8 visits)  Ankle pain = 0  Ankle strength = 4+/5  Ankle function: walk, squat, calf raise w/o pain     LONG TERM GOALS ( 12 visits)  Independent Home Exercise program  Return to normal activity     PATIENT GOAL  Run w/o pain    Pt. Education:  [x] Yes  [] No  [] Reviewed Prior HEP/Ed  Method of Education: [x] Verbal  [x] Demo  [] Written  Comprehension of Education:  [x] Verbalizes understanding. [x] Demonstrates understanding. [] Needs review. [] Demonstrates/verbalizes HEP/Ed previously given. Plan: [x] Continue current frequency toward long and short term goals.           Time In: 5:30pm            Time Out: 6:30pm    Electronically signed by:  Kaylan Hunt PTA

## 2020-12-14 ENCOUNTER — HOSPITAL ENCOUNTER (OUTPATIENT)
Dept: PHYSICAL THERAPY | Facility: CLINIC | Age: 25
Setting detail: THERAPIES SERIES
Discharge: HOME | End: 2020-12-14
Payer: COMMERCIAL

## 2020-12-14 PROCEDURE — 97140 MANUAL THERAPY 1/> REGIONS: CPT

## 2020-12-14 PROCEDURE — 97110 THERAPEUTIC EXERCISES: CPT

## 2020-12-14 NOTE — FLOWSHEET NOTE
[]  Modalities: CP     [x]  Ther Exercise 40 2   [x]  Manual Therapy 10 1   []  Ther Activities     []  Aquatics     [x]  Vasocompression     []  Other     Total Treatment time 50 3       Assessment: [x] Progressing toward goals. [] No change. [x] Other: Therex completed as noted above. Improved stability noted with SL DL cups, improved bilaterally. Pt denies completing HEP, encouraged pt to be more consistent to build LE strength and stability. Remains tender at bilateral gastroc/soleus. SHORT TERM GOALS ( 8 visits)  Ankle pain = 0  Ankle strength = 4+/5  Ankle function: walk, squat, calf raise w/o pain     LONG TERM GOALS ( 12 visits)  Independent Home Exercise program  Return to normal activity     PATIENT GOAL  Run w/o pain    Pt. Education:  [x] Yes  [] No  [] Reviewed Prior HEP/Ed  Method of Education: [x] Verbal  [x] Demo  [] Written  Comprehension of Education:  [x] Verbalizes understanding. [x] Demonstrates understanding. [] Needs review. [] Demonstrates/verbalizes HEP/Ed previously given. Plan: [x] Continue current frequency toward long and short term goals.           Time In: 5:30pm            Time Out: 6:30pm    Electronically signed by:  Shellie Self PTA

## 2020-12-21 ENCOUNTER — HOSPITAL ENCOUNTER (OUTPATIENT)
Dept: PHYSICAL THERAPY | Facility: CLINIC | Age: 25
Setting detail: THERAPIES SERIES
Discharge: HOME | End: 2020-12-21
Payer: COMMERCIAL

## 2020-12-21 PROCEDURE — 97140 MANUAL THERAPY 1/> REGIONS: CPT

## 2020-12-21 PROCEDURE — 97110 THERAPEUTIC EXERCISES: CPT

## 2020-12-28 ENCOUNTER — HOSPITAL ENCOUNTER (OUTPATIENT)
Dept: PHYSICAL THERAPY | Facility: CLINIC | Age: 25
Setting detail: THERAPIES SERIES
Discharge: HOME | End: 2020-12-28
Payer: COMMERCIAL

## 2020-12-28 PROCEDURE — 97110 THERAPEUTIC EXERCISES: CPT

## 2020-12-28 PROCEDURE — 97140 MANUAL THERAPY 1/> REGIONS: CPT

## 2021-01-14 ENCOUNTER — HOSPITAL ENCOUNTER (OUTPATIENT)
Dept: PHYSICAL THERAPY | Facility: CLINIC | Age: 26
Setting detail: THERAPIES SERIES
Discharge: HOME | End: 2021-01-14
Payer: COMMERCIAL

## 2021-01-14 PROCEDURE — 97110 THERAPEUTIC EXERCISES: CPT

## 2021-01-14 PROCEDURE — 97140 MANUAL THERAPY 1/> REGIONS: CPT

## 2021-01-14 NOTE — FLOWSHEET NOTE
[] [facility]) - [facility] &  Therapy  [address]  [phone]  F: [phone] [] [address]  [facility] 36   [address]  P: [phone]  F: [phone] [x] [address] &  Therapy  [address] [facility]  P: [phone]  F: [phone] [] [address]  [facility]   [address]   [place]: [phone]  F: [phone]      Physical Therapy Daily Treatment Note    Date:  2021  Patient Name:  Iliana Bahena    :  1995  MRN: [id number]  Physician: [address] [place] [address] [address]: [place] [address]       Medical Diagnosis: B post tibialis tendonitis                          Rehab Codes: M76.829  Onset Date:                 Visit# / total visits:      Cancels/No Shows: 0/0    Subjective: Pt states that she has had a busy few weeks transitioning to a new position at her work. Less time spent on HEP. Feet have been feeling ok, still get sore but at the same time has not done much to challenge them. Pain:  [] Yes  [x] No Location: BLE posterior tib  Pain Rating: (0-10 scale) 2/10  Pain altered Tx:  [x] No  [] Yes  Action:  Comments:     Objective:  Modalities: Vasocompression bilat ankle - 10', medium pressure - held  Precautions:  Exercises:  Exercise Reps/ Time Weight/ Level Comments    Bike 10'   x   Gastroc Stretch 3x30\"   x   HS stool stretch 2x30\"      BAPS bilat 15x ea L4 Fwd/back.  CW/CCW x          BOSU Lunges 10x ea  Bilat x   BOSU high step ups w/ MB reach 3x10 6#     TGym squats/HR  30x ea L15 sidelying    SL RDL 2x ea cups  x   T-band 4 way ankle 15x plum     3 way hip 10x ea plum R CKC w/ blue pad x   Tband sidesteppping 3L blue     Heel taps 2x10 4\"  x          SB Bridges 3x10      SL clams 2x10 blue  x   SL hip abduction 2x10 blue  x   Prone hip ext 2x10  Knee bent/kne straight    Other:    MFR bilat gastroc, peroneals  - 10'    Specific Instructions for subsequent treatments:     Treatment Charges: Mins Units   []  Modalities: CP     [x]  Ther Exercise 40 2   [x]  Manual Therapy 10 1   []  Ther Activities     []  Aquatics     [x]  Vasocompression     []  Other     Total Treatment time 50 3       Assessment: [x] Progressing toward goals. [] No change.     _ Other: Pt completed charted ex above with good tolerance. Balance ex remain challenging bilaterally but pt mentioning less pain/soreness with completion. Does fatigue with strengthening ex quickly. Significant TP noted throughout bilat gastroc soleus. SHORT TERM GOALS ( 8 visits)  Ankle pain = 0  Ankle strength = 4+/5  Ankle function: walk, squat, calf raise w/o pain     LONG TERM GOALS ( 12 visits)  Independent Home Exercise program  Return to normal activity     PATIENT GOAL  Run w/o pain    Pt. Education:  [x] Yes  [] No  [] Reviewed Prior HEP/Ed  Method of Education: [x] Verbal  [x] Demo  [] Written  Comprehension of Education:  [x] Verbalizes understanding. [x] Demonstrates understanding. [] Needs review. [] Demonstrates/verbalizes HEP/Ed previously given. Plan: [x] Continue current frequency toward long and short term goals.           Time In: 5:30 pm            Time Out: 6:20pm    Electronically signed by:  Darlene Larios PTA

## 2021-01-25 ENCOUNTER — HOSPITAL ENCOUNTER (OUTPATIENT)
Dept: PHYSICAL THERAPY | Facility: CLINIC | Age: 26
Setting detail: THERAPIES SERIES
Discharge: HOME | End: 2021-01-25
Payer: COMMERCIAL

## 2021-02-15 NOTE — FLOWSHEET NOTE
[address]  29 [facility] 36.  Phone: [phone]  Fax: [phone]    PHYSICAL THERAPY DISCHARGE SUMMARY    Date: 2/15/2021  Patient Name: Humberto Martinez        MRN: [id number]     Acct#:   : 1995  (26 y.o.)    Physician: [address] [place] [address] [address]: [place] [address]             Eligibility Status:  Eligible     Secondary Insurance (if applicable) ALLIED BENEFIT SYSTEM             Eligibility Status: Eligible     DOS: 20  # of visits allowed/remaining: based on med necessity   Source: Phone  Spoke Zora Ren  Reference: [id number]  Auth: Nikhil  Medical Diagnosis: B post tibialis tendonitis                          Rehab Codes: M76.829  Onset Date:      Date of Initial Eval: 20  Date of Final Treatment: 21  Total number of visits: 25    Discharge Status:  [ ] Patient recovered from condition. Treatment Goals were met. [ ] Patient received maximum benefit. No further therapy indicated at this time. [ ] Patient demonstrated improvement from condition with          of           goals met. [ ] Patient to continue exercises/home instructions independently. [ ] Therapy interrupted due to:  [x] Patient has two or more no-shows/cancellations and has been discontinued per our no show/cancellation policy. [ ] Patient has completed their prescribed number of treatment sessions. [ ] Other:      Pain level at Evaluation was    8    /10 and at Discharge was     2   /10. [ ] Patient returned to work. [ ] Patient demonstrated improved level of function. [ ] Patient has returned to previous functional level. [x] Patients current status unknown due to no-shows  [ ] Other:     Recommendations/Comments: As of last visit pt improving, but pt did not return for further PT.      Treatment Included:  [x] Therapeutic Exercise  [  ] Manual Therapy  [ x ] Hot/Cold Pack  [  Lonzell Gravely  [  ] Elec-Stim [  ] Iontophoresis [  ]Aquatics [  ]  Therapeutic Activity   [  ] Neuro Re-Education  [  ] Gait    [  ] Massage  [  ] Traction    Thank you for the patient referral to Physical Therapy.  Please feel free to contact me with any questions or concerns regarding this patient's care.    ______________________________________  Brie Hernandez   PT ATC    Date: 2/15/2021

## 2021-05-27 NOTE — FLOWSHEET NOTE
[] [facility]) [facility] &  Therapy  [address]  [phone]  F: [phone] [] [address]  [facility] 36   [address]  P: [phone]  F: [phone] [x] [address] &  Therapy  [address] [facility] [address]  P: [phone]  F: [phone] [] [address]  Psychiatric   [address]   [place]: [phone]  F: [phone]      Physical Therapy Daily Treatment Note    Date:  2020  Patient Name:  Josefina Peabody    :  1995  MRN: [id number]  Physician: [address] [place] [address] [address]: Laverne Apgar       Medical Diagnosis: B post tibialis tendonitis                          Rehab Codes: M76.829  Onset Date:                 Visit# / total visits:      Cancels/No Shows: 0/0    Subjective: Pt states that her L hip is a little more sore this morning, worked an extra shift at [employer] and feels that may be the issue. L/R calf sore as well. Pain:  [] Yes  [x] No Location: BLE posterior tib  Pain Rating: (0-10 scale) 2/10  Pain altered Tx:  [x] No  [] Yes  Action:  Comments:     Objective:  Modalities: Vasocompression bilat ankle - 10', medium pressure   Precautions:  Exercises:  Exercise Reps/ Time Weight/ Level Comments    Bike 10'   x   Gastroc Stretch 3x30\"   x   HS stool stretch 2x30\"      BAPS bilat 15x ea L4 Fwd/back.  CW/CCW x          BOSU Lunges 10x ea  Bilat x   BOSU high step ups w/ MB reach 3x10 6#     TGym squats/HR  30x ea L15 sidelying    SL RDL 2x ea cups  x   T-band 4 way ankle 15x plum     3 way hip 10x ea plum R CKC w/ blue pad x   Tband sidesteppping 3L blue     Heel taps 2x10 4\"  x          SB Bridges 3x10   x   SL clams 2x10 blue  x   SL hip abduction 2x10 blue  x   Prone hip ext 2x10  Knee bent/kne straight    Other:    MFR bilat gastroc, peroneals  - 10'    Specific Instructions for subsequent treatments:     Treatment Charges: Mins Units   []  Modalities: CP     [x]  Ther Exercise 40 2   [x]  Manual Therapy 10 1   []  Ther Activities     []  Aquatics     [x]  Vasocompression     []  Other     Total Treatment time 50 3       Assessment: [x] Progressing toward goals. [] No change.     _ Other: increased soreness with progression through gym ex this date. No decrease in instability noted. Remains tender into both gastroc complex. RLE remains weaker with completion of mat ex compared to LLE. Encouraged pt to continue to complete HEP, continue with going to the gym as well. Will return in 2 weeks. SHORT TERM GOALS ( 8 visits)  Ankle pain = 0  Ankle strength = 4+/5  Ankle function: walk, squat, calf raise w/o pain     LONG TERM GOALS ( 12 visits)  Independent Home Exercise program  Return to normal activity     PATIENT GOAL  Run w/o pain    Pt. Education:  [x] Yes  [] No  [] Reviewed Prior HEP/Ed  Method of Education: [x] Verbal  [x] Demo  [] Written  Comprehension of Education:  [x] Verbalizes understanding. [x] Demonstrates understanding. [] Needs review. [] Demonstrates/verbalizes HEP/Ed previously given. Plan: [x] Continue current frequency toward long and short term goals.           Time In: 10:30am            Time Out: 11:20am    Electronically signed by:  Otila Kocher, PTA [phone]